# Patient Record
Sex: FEMALE | Race: WHITE | NOT HISPANIC OR LATINO | Employment: FULL TIME | URBAN - METROPOLITAN AREA
[De-identification: names, ages, dates, MRNs, and addresses within clinical notes are randomized per-mention and may not be internally consistent; named-entity substitution may affect disease eponyms.]

---

## 2017-03-18 ENCOUNTER — VIRTUAL VISIT (OUTPATIENT)
Dept: FAMILY MEDICINE | Facility: OTHER | Age: 27
End: 2017-03-18

## 2017-03-18 NOTE — PROGRESS NOTES
"Date:   Clinician: Teo Amaral  Clinician NPI: [id number]  Patient: Gwendolyn Yi  Patient : 1990  Patient Address: [facility] [address] [place] [address]  Patient Phone: [phone]  Visit Protocol: URI  Patient Summary:  Gwendolyn is a 27 year old ( : 1990 ) female who initiated a Zip for a presumed sinus infection. When asked the question \"Do you have a [place] primary care physician?\", Gwendolyn responded \"I don't know\".    Gwendolyn was evaluated 4+ weeks ago in a clinic and diagnosed with sinusitis. Gwendolyn was given Amoxicillin. She took the medication(s) as prescribed.   Her symptoms started suddenly 1 week ago and consist of malaise, fever, rhinitis, post-nasal drainage, nasal congestion, and hoarse voice.   She denies myalgias, ear pain, chest pain, loss of appetite, cough, itchy eyes, chills, nausea, vomiting, dyspnea, dysphagia, and sore throat. She denies a history of facial surgery.   Her profuse nasal secretions are clear, yellow, and green. Her mild facial pain or pressure feels worse when bending over or leaning forward and is located on both sides of her head. The facial pain or pressure started after the onset of other URI symptoms.  She has teeth pain and is confident the tooth pain is not from a cavity, recent dental work or other mouth problems. Gwendolyn has a mild headache. The headache did not start before her other symptoms and is located on both sides of her head.   In the past year Gwendolyn has been diagnosed with one (1) episodes of sinusitis. When asked to feel her neck she reported enlarged lymph nodes. Gwendolyn noted that the enlarged neck lymph nodes developed AFTER the onset of upper respiratory symptoms. She denies axillary lymphadenopathy.   Her highest temperature was 100.9 degrees Fahrenheit. Her current temperature is 99.1 degrees Fahrenheit. Gwendolyn has had a fever for 1 - 2 days and used the oral method for measuring her temperature.   She has passed urine in the past 12 hours. "   Gwendolyn denies having COPD or other chronic lung disease.   Pulse: self-reported pulse rate as: 11 beats in 10 seconds.   Current Temperature (F): 99.1     Weight (in lbs): 194   She states she is not pregnant and denies breastfeeding. She has menstruated in the past month.   Gwendolyn does not smoke or use smokeless tobacco.   MEDICATIONS:  No current medications , ALLERGIES:    Patient free text response:   gissel    Clinician Response:  Dear Gwendolyn,  Based on the information you have provided, you likely have  acute sinusitis, otherwise known as a sinus infection.   I am prescribing fluticasone propionate nasal (Flonase) 50 mcg/spray. Take one or two inhalations in each nostril one time a day. There are no refills with this prescription.   I am prescribing amoxicillin-Pot Clavulanate [Augmentin] 875-125mg. Take one tablet by mouth two times a day for 10 days. There are no refills with this prescription.   Unless your are allergic to the over-the-counter medication(s) below, I recommend using:   A sinus irrigation kit such as Sinus Rinse, Neti Pot, SinuCleanse (or store brand). Be sure to use sterile or previously boiled water to prevent unwanted infections.   Guaifenesin + dextromethorphan (Robitussin DM, Mucinex DM). This is an over-the-counter medication that does not require a prescription.   A decongestant such as pseudoephedrine (Sudafed or store brand) to help your symptoms. This is an over-the-counter medication that does not require a prescription.   Ibuprofen. Take 1-3 200mg tablets (200-600mg) every 8 hours to help with the discomfort. Make sure to take the ibuprofen with food. Do not exceed 2400mg in 24 hours. This is an over-the-counter medication that does not require a prescription.   Drink plenty of liquids, especially water and take time to rest your body. This may mean taking a nap or going to bed earlier. Your body is fighting an infection and liquids and rest will improve the pace of recovery.  Remember to regularly wash your hands and avoid close contact with others to prevent spreading your infection.   Some people develop allergies to antibiotics. If you notice a new rash, significant swelling or difficulty breathing, stop the medication immediately and go into a clinic for physical evaluation.   Some women may also develop a yeast infection as a side effect of taking antibiotics. If you notice symptoms of a yeast infection, please use Zipnosis to get treatment.   If you become pregnant during this course of treatment with medication, stop taking the medication and contact your primary care clinician.   Diagnosis: Acute Sinusitis  Diagnosis ICD: J01.90  Prescription: amoxicillin-Pot Clavulanate (Augmentin) 875-125 mg oral tablet 20 tablets, 10 days supply. Take one tablet by mouth two times a day for 10 days. Refills: 0, Refill as needed: no, Allow substitutions: yes  Prescription: fluticasone propionate (Flonase) 50mcg nasal spray 16 gm, 30 days supply. Take one or two inhalations in each nostril one time a day. Refills: 0, Refill as needed: no, Allow substitutions: yes  Pharmacy: [facility] - [phone] - [address] [place] [address]

## 2018-12-03 ENCOUNTER — HEALTH MAINTENANCE LETTER (OUTPATIENT)
Age: 28
End: 2018-12-03

## 2019-01-14 ENCOUNTER — VIRTUAL VISIT (OUTPATIENT)
Dept: FAMILY MEDICINE | Facility: OTHER | Age: 29
End: 2019-01-14

## 2019-01-14 NOTE — PROGRESS NOTES
"Date:   Clinician: Keyshawn Payne  Clinician NPI: [id number]  Patient: Gwendolyn Yi  Patient : 1990  Patient Address: [address] [place] [address]  Patient Phone: [phone]  Visit Protocol: Eye conditions  Patient Summary:  Gwendolyn is a 29 year old (: 1990 ) female who initiated a Visit for conjunctivitis.  When asked the question \"Please sign me up to receive news, health information and promotions. \", Gwendolyn responded \"No\".    Images of her eye condition were uploaded.   Her symptoms started today and affect the left eye. The symptoms consist of drainage coming from the eye(s), eye redness, and itchy eye(s).   Symptom details     Drainage: The color of the drainage coming out of her eye(s) is white. The drainage is thick and causes her eyelids to be stuck shut in the morning.    Itchiness: Gwendolyn does not have seasonal allergies or hay fever.     Denied symptoms include eyelid swelling, light sensitivity, eye pain, and bumps on the eyelid. Gwendolyn does not have subconjunctival hemorrhage and has not experienced a decrease in vision. She does not feel feverish.    Gwendolyn has not had a recent diagnosis of conjunctivitis. She also has not had a recent cold or ear infection, foreign body in the eye(s), eye injury, and eye surgery. It is not known if Gwendolyn has recently been exposed to someone with a red eye or an eye infection. She does not wear contact lenses. Gwendolyn has not ever been diagnosed with glaucoma.   Gwendolyn has been using Opcon-A ophthalmic solution to treat her current symptoms.   Medication efficacy as reported by the patient (free text): Its not   Gwendolyn does not require proof of evaluation of her eye condition before returning to school, work, or .   Gwendolyn does not smoke or use smokeless tobacco.   She denies pregnancy and denies breastfeeding. She has menstruated in the past month.   MEDICATIONS: Zoloft oral, ALLERGIES: Tylenol-Codeine #3  Clinician Response:  Dear Gwendolyn,  " Based on the information provided, you most likely have bacterial conjunctivitis, more commonly called pink eye.  I am prescribing:  Sulfacetamide sodium 10% ophthalmic (eye) drops. Apply 1-2 drops into the affected eye(s) every 3-4 hours for 7 days. There are no refills with this prescription.  The medication I prescribed is an antibiotic medication. Infections can be caused by either bacteria or a virus, and often have similar symptoms, so it is possible that this is a viral infection. Antibiotics are only effective against bacterial infections, so when it is caused by a virus, the medication will not help symptoms improve or make it less contagious.  To reduce the spread of the eye infection, you should not use eye makeup until the infection has fully resolved, and be sure to wash your hands at least once per hour and avoid touching the eyes as much as possible.  The following will reduce the risk for future eye infections:     Frequent handwashing    Replace towels and washcloths daily    Do not share towels and washcloths with others    Replace eye makeup used while eyes were infected    Do not use anyone else's eye makeup     Follow up with your provider if you are taking your medication as directed and do not see any improvements after 2 days. Be seen earlier if you develop any new or worsening symptoms.   Diagnosis: Bacterial conjunctivitis  Diagnosis ICD: H10.9  Prescription: sulfacetamide sodium 10 % ophthalmic (eye) drops 1 15 ml dropper bottle, 7 days supply. Apply 1-2 drops into the affected eye(s) every 3-4 hours for 7 days. Refills: 0, Refill as needed: no, Allow substitutions: yes  Pharmacy: [facility] - - [phone] - [id number] [place] [address]

## 2019-04-09 ENCOUNTER — HOSPITAL ENCOUNTER (EMERGENCY)
Facility: CLINIC | Age: 29
Discharge: HOME | End: 2019-04-09
Attending: FAMILY MEDICINE | Admitting: FAMILY MEDICINE
Payer: COMMERCIAL

## 2019-04-09 VITALS
RESPIRATION RATE: 20 BRPM | OXYGEN SATURATION: 97 % | TEMPERATURE: 98 F | SYSTOLIC BLOOD PRESSURE: 132 MMHG | BODY MASS INDEX: 38.97 KG/M2 | WEIGHT: 220 LBS | DIASTOLIC BLOOD PRESSURE: 96 MMHG

## 2019-04-09 DIAGNOSIS — J45.31 MILD PERSISTENT ASTHMA WITH EXACERBATION: ICD-10-CM

## 2019-04-09 DIAGNOSIS — K21.00 GASTROESOPHAGEAL REFLUX DISEASE WITH ESOPHAGITIS: ICD-10-CM

## 2019-04-09 DIAGNOSIS — J30.2 SEASONAL ALLERGIC RHINITIS, UNSPECIFIED TRIGGER: ICD-10-CM

## 2019-04-09 PROCEDURE — 25000125 ZZHC RX 250: Performed by: FAMILY MEDICINE

## 2019-04-09 PROCEDURE — 99283 EMERGENCY DEPT VISIT LOW MDM: CPT | Mod: 25 | Performed by: FAMILY MEDICINE

## 2019-04-09 PROCEDURE — 94640 AIRWAY INHALATION TREATMENT: CPT | Performed by: FAMILY MEDICINE

## 2019-04-09 PROCEDURE — 99284 EMERGENCY DEPT VISIT MOD MDM: CPT | Mod: Z6 | Performed by: FAMILY MEDICINE

## 2019-04-09 RX ORDER — IPRATROPIUM BROMIDE AND ALBUTEROL SULFATE 2.5; .5 MG/3ML; MG/3ML
3 SOLUTION RESPIRATORY (INHALATION) ONCE
Status: COMPLETED | OUTPATIENT
Start: 2019-04-09 | End: 2019-04-09

## 2019-04-09 RX ORDER — PREDNISONE 20 MG/1
TABLET ORAL
Qty: 12 TABLET | Refills: 0 | Status: SHIPPED | OUTPATIENT
Start: 2019-04-09 | End: 2019-04-19

## 2019-04-09 RX ADMIN — IPRATROPIUM BROMIDE AND ALBUTEROL SULFATE 3 ML: .5; 3 SOLUTION RESPIRATORY (INHALATION) at 07:52

## 2019-04-09 ASSESSMENT — ENCOUNTER SYMPTOMS
NAUSEA: 0
SORE THROAT: 0
VOMITING: 0
SHORTNESS OF BREATH: 0
DIARRHEA: 0
CONSTIPATION: 0
COUGH: 1
FEVER: 0
SINUS PRESSURE: 0
FREQUENCY: 0
WHEEZING: 1
HEADACHES: 0
PALPITATIONS: 0
ABDOMINAL PAIN: 0
DIAPHORESIS: 0
CHILLS: 0
DYSURIA: 0
BLOOD IN STOOL: 0

## 2019-04-09 NOTE — ED PROVIDER NOTES
"  History     Chief Complaint   Patient presents with     Shortness of Breath     increase SOA, inhaler not relieving sx. finished prednisone and z pack last week     HPI  Gwendolyn Yi is a 29 year old female who presents with onset of dyspnea since early uary - treated at [facility] twice for \"pneumonia\".   complicated steroid course on  - 5 days course and had her second z-pack course in the last 2 months.  only uses albuterol at home for rescue no controller meds.  life long asthma history with rare albuteroll rescue use until this yearl.  recent marked increased in use - multiple times daily without relief.     low grade fever, coughing, wheezing. shortness of breath on exertion. chest tightness midline.  No tobacco - quit 2 years ago  \"eats tums\" - frequent use of antacids for acid reflux.    sweats - while on prednisone  No nausea or vomiting      Denies recent prolonged travel (>3 hours) by car or plane, history or FHx of venous thromboembolism, recent surgery (last 4 weeks), active cancer history, hypercoagulable state, estrogen or other medications/conditions causing VTE or  new unilateral swelling or pain in the legs or calves.     Allergies:  Allergies   Allergen Reactions     Nuts Anaphylaxis     Codeine Camsylate        Problem List:    Patient Active Problem List    Diagnosis Date Noted     Cyst of left ovary 2015     Priority: Medium     DUB (dysfunctional uterine bleeding) 2015     Priority: Medium        Past Medical History:    Past Medical History:   Diagnosis Date     Asthma      Depression      DUB (dysfunctional uterine bleeding)        Past Surgical History:    Past Surgical History:   Procedure Laterality Date     SURGICAL HISTORY OF -       bone removed from right foot       Family History:    Family History   Problem Relation Age of Onset     Lupus Mother      Cancer Father 42        esophageal-        Social History:  Marital Status:   " [2]  Social History     Tobacco Use     Smoking status: Never Smoker   Substance Use Topics     Alcohol use: Yes     Comment: occas     Drug use: No        Medications:      No current outpatient medications on file.      Review of Systems   Constitutional: Negative for chills, diaphoresis and fever.   HENT: Negative for ear pain, sinus pressure and sore throat.    Eyes: Negative for visual disturbance.   Respiratory: Positive for cough and wheezing. Negative for shortness of breath.    Cardiovascular: Negative for chest pain and palpitations.   Gastrointestinal: Negative for abdominal pain, blood in stool, constipation, diarrhea, nausea and vomiting.   Genitourinary: Negative for dysuria, frequency and urgency.   Skin: Negative for rash.   Neurological: Negative for headaches.   All other systems reviewed and are negative.      Physical Exam   BP: (!) 132/96  Heart Rate: 80  Temp: 98  F (36.7  C)  Resp: 20  Weight: 99.8 kg (220 lb)  SpO2: 94 %      Physical Exam   Constitutional: She appears distressed.   HENT:   Mouth/Throat: Oropharynx is clear and moist.   Eyes: Conjunctivae are normal.   Neck: Neck supple.   Cardiovascular: Normal rate, regular rhythm and normal heart sounds. Exam reveals no friction rub.   No murmur heard.  Pulmonary/Chest: No stridor. She is in respiratory distress. She has wheezes.   Abdominal: Soft. Bowel sounds are normal. She exhibits no distension. There is no tenderness. There is no guarding.   Musculoskeletal: She exhibits no edema.   Neurological: She exhibits normal muscle tone.   Skin: No rash noted. She is not diaphoretic.       ED Course        Procedures               Critical Care time:  none               No results found for this or any previous visit (from the past 24 hour(s)).    Medications   ipratropium - albuterol 0.5 mg/2.5 mg/3 mL (DUONEB) neb solution 3 mL (3 mLs Nebulization Given 4/9/19 0824)       Assessments & Plan (with Medical Decision Making)     MDM: Gwendolyn  Kd is a 29 year old female who presents with 2 months of refractory progression of her mild intermittent asthma for which she has had for some time but has been refractory to her as needed albuterol and she has required several courses of Zithromax and prednisone during which she is improved but then her symptoms recur.  She was on corticosteroids orally until only a few days ago and likely triggered by upper respiratory infection.  However she also has other possible triggers including allergic rhinitis which she notes persistent allergy symptoms such as rhinorrhea sneezing and itchy eyes as well as acid reflux that is undertreated with only as needed medications such as Tums.   Differential diagnosis is considered of refractory dyspnea, but she has no risk factors for venous thromboembolism, no associated significant chest pain, hypoxia, tachycardia.  She has no lower extremity edema or orthopnea.  Her wheezing present on exam and improved with DuoNeb's appears to be consistent with asthma.  She has quit tobacco.  We discussed management with extending her prednisone which completed on Sunday while restarting a steroid inhaler.  I prescribed Qvar twice daily.  We also discussed concurrently managing allergies and gastroesophageal reflux as below.  I given a consult for asthma and allergy specialist and have also recommended that she follow-up with primary provider before prednisone is complete.  Additional management may require a more potent corticosteroid inhaler or a combination with long-acting bronchodilator such as Advair.  I have also given her precautions for return and have discussed that additional testing including chest x-ray and laboratory testing may be needed for persistent dyspnea if atypical features occur.  At this time no repeat imaging or testing was indicated    I have reviewed the nursing notes.    I have reviewed the findings, diagnosis, plan and need for follow up with the patient.           Medication List      There are no discharge medications for this visit.         Final diagnoses:   Mild persistent asthma with exacerbation - Take prednisone 40 mg for 3 days, 20 mg for 3 days, 10 mg for 3 days then stop.  manage triggers (allergic rhinitis and GERD).  follow-up clinic for recheck. start qvar inhaler (or similar depneding on insurance).,  treat GERD, allergic rhinitis .  allergy asthma specialist and consider PFTs when done   Seasonal allergic rhinitis, unspecified trigger - use flonase daily starting now at least through allergy season. may also use zyrtec 10 mg orally daily.  follow-up asthma allergy specialist   Gastroesophageal reflux disease with esophagitis - prilsoec 20 mg orally daily for at least the next 4 weeks. no food 2 hours before bed. limit caffeine and alcohol,       4/9/2019   [facility] EMERGENCY DEPARTMENT     Gustavo Ortiz MD  04/09/19 6436

## 2019-04-09 NOTE — ED AVS SNAPSHOT
[facility] Emergency Department  [address] [address]  Phone:  [phone]  Fax:  [phone]                                    Gwendolyn Yi   MRN: [id number]    Department:  [facility] Emergency Department   Date of Visit:  4/9/2019           After Visit Summary Signature Page    I have received my discharge instructions, and my questions have been answered. I have discussed any challenges I see with this plan with the nurse or doctor.    ..........................................................................................................................................  Patient/Patient Representative Signature      ..........................................................................................................................................  Patient Representative Print Name and Relationship to Patient    ..................................................               ................................................  Date                                   Time    ..........................................................................................................................................  Reviewed by Signature/Title    ...................................................              ..............................................  Date                                               Time          22EPIC Rev 08/18

## 2019-04-09 NOTE — DISCHARGE INSTRUCTIONS
ICD-10-CM    1. Mild persistent asthma with exacerbation J45.31     Take prednisone 40 mg for 3 days, 20 mg for 3 days, 10 mg for 3 days then stop.  manage triggers (allergic rhinitis and GERD).  follow-up clinic for recheck. start qvar inhaler (or similar depneding on insurance).,  treat GERD, allergic rhinitis .  allergy asthma specialist and consider PFTs when done   2. Seasonal allergic rhinitis, unspecified trigger J30.2     use flonase daily starting now at least through allergy season. may also use zyrtec 10 mg orally daily.  follow-up asthma allergy specialist   3. Gastroesophageal reflux disease with esophagitis K21.0     prilsoec 20 mg orally daily for at least the next 4 weeks. no food 2 hours before bed. limit caffeine and alcohol,

## 2020-01-04 ENCOUNTER — HOSPITAL ENCOUNTER (EMERGENCY)
Facility: CLINIC | Age: 30
Discharge: HOME | End: 2020-01-04
Attending: FAMILY MEDICINE | Admitting: FAMILY MEDICINE
Payer: COMMERCIAL

## 2020-01-04 VITALS
DIASTOLIC BLOOD PRESSURE: 87 MMHG | HEART RATE: 70 BPM | RESPIRATION RATE: 16 BRPM | TEMPERATURE: 97.7 F | HEIGHT: 63 IN | WEIGHT: 220 LBS | SYSTOLIC BLOOD PRESSURE: 128 MMHG | OXYGEN SATURATION: 100 % | BODY MASS INDEX: 38.98 KG/M2

## 2020-01-04 DIAGNOSIS — H10.32 ACUTE BACTERIAL CONJUNCTIVITIS OF LEFT EYE: ICD-10-CM

## 2020-01-04 PROCEDURE — 99283 EMERGENCY DEPT VISIT LOW MDM: CPT | Performed by: FAMILY MEDICINE

## 2020-01-04 PROCEDURE — 99284 EMERGENCY DEPT VISIT MOD MDM: CPT | Mod: Z6 | Performed by: FAMILY MEDICINE

## 2020-01-04 RX ORDER — TETRACAINE HYDROCHLORIDE 5 MG/ML
1-2 SOLUTION OPHTHALMIC ONCE
Status: DISCONTINUED | OUTPATIENT
Start: 2020-01-04 | End: 2020-01-05 | Stop reason: HOSPADM

## 2020-01-04 RX ORDER — GENTAMICIN SULFATE 3 MG/ML
1 SOLUTION/ DROPS OPHTHALMIC EVERY 4 HOURS
Qty: 3 ML | Refills: 0 | Status: SHIPPED | OUTPATIENT
Start: 2020-01-04 | End: 2020-01-11

## 2020-01-04 ASSESSMENT — MIFFLIN-ST. JEOR: SCORE: 1702.04

## 2020-01-04 NOTE — LETTER
January 4, 2020      To Whom It May Concern:      Gwendolyn Yi was seen in our Emergency Department today, 01/04/20.  I expect her condition to improve over the next 3-4 days.  She may return to work, but until eye is without drainage, would no immunocompromised patient contact.    Sincerely,        Gustavo Ortiz MD

## 2020-01-04 NOTE — LETTER
January 4, 2020      To Whom It May Concern:      Gwendolyn Yi was seen in our Emergency Department today, 01/04/20.  I expect her condition to improve over the next 1-2 days.  She may return to work when improved.    Sincerely,      Dr Gustavo Marroquin RN

## 2020-01-04 NOTE — ED AVS SNAPSHOT
[facility] Emergency Department  [address] [address]  Phone:  [phone]  Fax:  [phone]                                    Gwendolyn Yi   MRN: [id number]    Department:  [facility] Emergency Department   Date of Visit:  1/4/2020           After Visit Summary Signature Page    I have received my discharge instructions, and my questions have been answered. I have discussed any challenges I see with this plan with the nurse or doctor.    ..........................................................................................................................................  Patient/Patient Representative Signature      ..........................................................................................................................................  Patient Representative Print Name and Relationship to Patient    ..................................................               ................................................  Date                                   Time    ..........................................................................................................................................  Reviewed by Signature/Title    ...................................................              ..............................................  Date                                               Time          22EPIC Rev 08/18

## 2020-01-05 NOTE — ED PROVIDER NOTES
"  History     Chief Complaint   Patient presents with     Eye Problem     left eye redness present, \"itchy feeling\" , feels \"like theirs grit in it\" no injury to eye per pt. no URI sx. no known exposure to pink eye.      HPI  Gwendolyn Yi is a 29 year old female who presents with normal vision history  - no contacts or glasses, with onset of eye redness, irritation this morning, as the day wore on developed increased LT eye drainage - film over the top.  no known foreign body, no eye injury,   was at dentist yesterday for cleaning and the hydro- was being used and did spray into the eyes.  previously healthy.  no vision change. No eye pain -just irritation and no cold sores     no URi symptoms  Allergies:  Allergies   Allergen Reactions     Nuts Anaphylaxis     Codeine Camsylate        Problem List:    Patient Active Problem List    Diagnosis Date Noted     Cyst of left ovary 2015     Priority: Medium     DUB (dysfunctional uterine bleeding) 2015     Priority: Medium        Past Medical History:    Past Medical History:   Diagnosis Date     Asthma      Depression      DUB (dysfunctional uterine bleeding)        Past Surgical History:    Past Surgical History:   Procedure Laterality Date     SURGICAL HISTORY OF -       bone removed from right foot       Family History:    Family History   Problem Relation Age of Onset     Lupus Mother      Cancer Father 42        esophageal-        Social History:  Marital Status:   [2]  Social History     Tobacco Use     Smoking status: Never Smoker   Substance Use Topics     Alcohol use: Yes     Comment: occas     Drug use: No        Medications:    beclomethasone HFA (QVAR REDIHALER) 80 MCG/ACT inhaler          Review of Systems    ROS:  5 point ROS negative except as noted above in HPI, including Gen., Resp., CV, GI &  system review.      Physical Exam   BP: 128/87  Pulse: 70  Temp: 97.7  F (36.5  C)  Resp: 16  Height: 160 cm (5' 3\")  Weight: " 99.8 kg (220 lb)  SpO2: 100 %      Physical Exam     LT eye injection, drainage mild, no chemosis.  no light sensitivity. Normal globe. No fluorescein uptake.  No lesions.     pupils bilaterally dilated to 4 mm and reactive.  EOMI intact   slit lamp exam negative    ED Course        Procedures               Critical Care time:  none               No results found for this or any previous visit (from the past 24 hour(s)).    Medications - No data to display    Assessments & Plan (with Medical Decision Making)     MDM: Gwendolyn Yi is a 29 year old female who presents with conjunctival injection, discharge, eye pain, mattering without upper respiratory infection and possible exposure when she had her dental cleaning yesterday and noted splashing from the dental device into her eye.  No contact lens use.  Vision is normal here.  Recommended treating as possible bacterial conjunctivitis with recommendations as below and precautions for return.    I have reviewed the nursing notes.    I have reviewed the findings, diagnosis, plan and need for follow up with the patient.       New Prescriptions    No medications on file         ICD-10-CM    1. Acute bacterial conjunctivitis of left eye H10.32     use gent drops every 4 hours while awake for the next 5-7 days.  return immed for vision change.  note for work             1/4/2020   [facility] EMERGENCY DEPARTMENT     Gustavo Ortiz MD  01/05/20 0108

## 2020-01-05 NOTE — DISCHARGE INSTRUCTIONS
ICD-10-CM    1. Acute bacterial conjunctivitis of left eye H10.32     use gent drops every 4 hours while awake for the next 5-7 days.  return immed for vision change.  note for work

## 2020-01-05 NOTE — ED NOTES
Pt c/o left eye irritation, feels like it is pink eye. States has been using allergy drops all day with no relief. Eye blood shot.

## 2020-03-01 ENCOUNTER — HEALTH MAINTENANCE LETTER (OUTPATIENT)
Age: 30
End: 2020-03-01

## 2020-08-22 ENCOUNTER — OFFICE VISIT (OUTPATIENT)
Dept: URGENT CARE | Facility: URGENT CARE | Age: 30
End: 2020-08-22
Payer: COMMERCIAL

## 2020-08-22 VITALS
HEIGHT: 63 IN | TEMPERATURE: 97.8 F | RESPIRATION RATE: 16 BRPM | DIASTOLIC BLOOD PRESSURE: 78 MMHG | SYSTOLIC BLOOD PRESSURE: 104 MMHG | WEIGHT: 208.4 LBS | HEART RATE: 63 BPM | OXYGEN SATURATION: 100 % | BODY MASS INDEX: 36.93 KG/M2

## 2020-08-22 DIAGNOSIS — L08.9 LOCAL INFECTION OF SKIN AND SUBCUTANEOUS TISSUE: ICD-10-CM

## 2020-08-22 DIAGNOSIS — B35.3 ATHLETE'S FOOT, LEFT: Primary | ICD-10-CM

## 2020-08-22 PROCEDURE — 99203 OFFICE O/P NEW LOW 30 MIN: CPT | Performed by: NURSE PRACTITIONER

## 2020-08-22 RX ORDER — MONTELUKAST SODIUM 10 MG/1
10 TABLET ORAL
COMMUNITY
Start: 2020-02-12

## 2020-08-22 RX ORDER — FLUTICASONE PROPIONATE 50 MCG
1 SPRAY, SUSPENSION (ML) NASAL DAILY
COMMUNITY

## 2020-08-22 RX ORDER — CEPHALEXIN 500 MG/1
500 CAPSULE ORAL 3 TIMES DAILY
Qty: 21 CAPSULE | Refills: 0 | Status: SHIPPED | OUTPATIENT
Start: 2020-08-22 | End: 2020-08-29

## 2020-08-22 RX ORDER — THERMOMETER, ELECTRONIC,ORAL
EACH MISCELLANEOUS 2 TIMES DAILY
Qty: 113 G | Refills: 0 | Status: SHIPPED | OUTPATIENT
Start: 2020-08-22 | End: 2020-09-19

## 2020-08-22 ASSESSMENT — MIFFLIN-ST. JEOR: SCORE: 1644.43

## 2020-08-22 NOTE — PATIENT INSTRUCTIONS
Use cream and meds as directed.    Keep clean white socks on unless you are in sandals    Follow-up with your primary care provider next week and as needed.    Indications for emergent return to emergency department discussed with patient, who verbalized good understanding and agreement.  Patient understands the limitations of today's evaluation.             Patient Education     Athlete s Foot    Athlete s foot (tinea pedis) is caused by a fungal infection in the skin. It affects the skin between the toes, causing cracks in the skin called fissures. It can also affect the bottom of the foot where it causes dry white scales and peeling of the skin. This infection is more likely to occur when the foot is in hot, sweaty socks and shoes for long periods of time. You may feel itching and burning between your toes. This infection is treated with skin creams or medicine taken by mouth.  Home care  The following are general care guidelines:    It is important to keep the feet dry. Use absorbent cotton socks and change them if they become sweaty. Or wear an open-toe shoe or sandal. Wash the feet at least once a day with soap and water.    Apply the antifungal cream as prescribed. Some antifungal creams are available without a prescription.    It may take a week before the rash starts to improve. It can take about 3 to 4 weeks to completely clear. Continue the medicine until the rash is all gone.    Use over-the-counter antifungal powders or sprays on your feet after exposure to high-risk environments, such as public showers, gyms, and locker rooms. This can help prevent future infections. Wearing appropriate shoes in these situations can help.  Prevention  The following tips may help prevent athlete s foot:    Don't share shoes or socks with someone who has athlete's foot.    Don't walk barefoot in places where a fungal infection can spread quickly such as locker rooms, showers, and swimming pools.    Change socks  regularly.    Alternate shoes to assist in drying.  Follow-up care  Follow up with your healthcare provider as recommended if the rash does not improve after 10 days of treatment, or if the rash continues to spread.  When to seek medical care  Get medical attention right away if any of the following occur:    Fever of 100.4 F (38 C) or higher, or as directed    Increasing redness or swelling of the foot    Infection comes back soon after treatment    Pus draining from cracks in the skin  Date Last Reviewed: 8/1/2016 2000-2019 [employer]. [address] [place] [address], [place] [address]. All rights reserved. This information is not intended as a substitute for professional medical care. Always follow your healthcare professional's instructions.

## 2020-08-22 NOTE — PROGRESS NOTES
"Subjective     Gwendolyn Yi is a 30 year old female who presents to clinic today for the following health issues:    HPI   Chief Complaint   Patient presents with     Musculoskeletal Problem     left foot / toe pain. redness and swelling, itching, burning, hurts to walk on. and blisters inbetween toes x 1 1/2 weeks.  Has tried otc fugal cream               Review of Systems   Constitutional, HEENT, cardiovascular, pulmonary, GI, , musculoskeletal, neuro, skin, endocrine and psych systems are negative, except as otherwise noted.      Objective    /78   Pulse 63   Temp 97.8  F (36.6  C) (Tympanic)   Resp 16   Ht 1.6 m (5' 3\")   Wt 94.5 kg (208 lb 6.4 oz)   SpO2 100%   BMI 36.92 kg/m    Body mass index is 36.92 kg/m .  Physical Exam   GENERAL: healthy, alert and no distress, nontoxic in appearance  EYES: Eyes grossly normal to inspection, PERRL and conjunctivae and sclerae normal  HENT: normocephalic  NECK: supple with full ROM  ABDOMEN: soft, nontender  MS: no gross musculoskeletal defects noted, no edema  Left foot has mild athletes foot between 3rd and 4th toes with mild swelling and pinkness.     No results found for this or any previous visit (from the past 24 hour(s)).        Assessment & Plan  will treat athletes foot and secondary infection of left foot.  Problem List Items Addressed This Visit     None      Visit Diagnoses     Athlete's foot, left    -  Primary    Relevant Medications    fluticasone (FLONASE) 50 MCG/ACT nasal spray    montelukast (SINGULAIR) 10 MG tablet    cephALEXin (KEFLEX) 500 MG capsule    tolnaftate (TINACTIN) 1 % external cream    Local infection of skin and subcutaneous tissue        Relevant Medications    cephALEXin (KEFLEX) 500 MG capsule    tolnaftate (TINACTIN) 1 % external cream             BMI:   Estimated body mass index is 36.92 kg/m  as calculated from the following:    Height as of this encounter: 1.6 m (5' 3\").    Weight as of this encounter: 94.5 kg (208 lb " 6.4 oz).   Weight management plan: Patient was referred to their PCP to discuss a diet and exercise plan.          Patient Instructions   Use cream and meds as directed.    Keep clean white socks on unless you are in sandals    Follow-up with your primary care provider next week and as needed.    Indications for emergent return to emergency department discussed with patient, who verbalized good understanding and agreement.  Patient understands the limitations of today's evaluation.             Patient Education     Athlete s Foot    Athlete s foot (tinea pedis) is caused by a fungal infection in the skin. It affects the skin between the toes, causing cracks in the skin called fissures. It can also affect the bottom of the foot where it causes dry white scales and peeling of the skin. This infection is more likely to occur when the foot is in hot, sweaty socks and shoes for long periods of time. You may feel itching and burning between your toes. This infection is treated with skin creams or medicine taken by mouth.  Home care  The following are general care guidelines:    It is important to keep the feet dry. Use absorbent cotton socks and change them if they become sweaty. Or wear an open-toe shoe or sandal. Wash the feet at least once a day with soap and water.    Apply the antifungal cream as prescribed. Some antifungal creams are available without a prescription.    It may take a week before the rash starts to improve. It can take about 3 to 4 weeks to completely clear. Continue the medicine until the rash is all gone.    Use over-the-counter antifungal powders or sprays on your feet after exposure to high-risk environments, such as public showers, gyms, and locker rooms. This can help prevent future infections. Wearing appropriate shoes in these situations can help.  Prevention  The following tips may help prevent athlete s foot:    Don't share shoes or socks with someone who has athlete's foot.    Don't walk  barefoot in places where a fungal infection can spread quickly such as locker rooms, showers, and swimming pools.    Change socks regularly.    Alternate shoes to assist in drying.  Follow-up care  Follow up with your healthcare provider as recommended if the rash does not improve after 10 days of treatment, or if the rash continues to spread.  When to seek medical care  Get medical attention right away if any of the following occur:    Fever of 100.4 F (38 C) or higher, or as directed    Increasing redness or swelling of the foot    Infection comes back soon after treatment    Pus draining from cracks in the skin  Date Last Reviewed: 8/1/2016 2000-2019 [employer]. [address] [place] [id number]. All rights reserved. This information is not intended as a substitute for professional medical care. Always follow your healthcare professional's instructions.             Return in about 10 days (around 9/1/2020), or if symptoms worsen or fail to improve, for Follow up with your primary care provider.    KAYCEE Roman [facility]

## 2020-08-22 NOTE — LETTER
August 22, 2020      Gwendolyn Yi  [address]  [facility] [address]        To Whom It May Concern:    Gwendolyn Yi was seen in our clinic. Please release from work tomorrow.      Sincerely,        KAYCEE Roman CNP

## 2020-08-23 ASSESSMENT — ASTHMA QUESTIONNAIRES: ACT_TOTALSCORE: 25

## 2020-12-14 ENCOUNTER — HEALTH MAINTENANCE LETTER (OUTPATIENT)
Age: 30
End: 2020-12-14

## 2021-04-18 ENCOUNTER — HEALTH MAINTENANCE LETTER (OUTPATIENT)
Age: 31
End: 2021-04-18

## 2021-08-05 ENCOUNTER — HOSPITAL ENCOUNTER (EMERGENCY)
Facility: CLINIC | Age: 31
Discharge: HOME | End: 2021-08-05
Attending: NURSE PRACTITIONER | Admitting: NURSE PRACTITIONER
Payer: COMMERCIAL

## 2021-08-05 VITALS
HEIGHT: 63 IN | BODY MASS INDEX: 38.98 KG/M2 | HEART RATE: 89 BPM | WEIGHT: 220 LBS | DIASTOLIC BLOOD PRESSURE: 78 MMHG | SYSTOLIC BLOOD PRESSURE: 126 MMHG | OXYGEN SATURATION: 97 % | TEMPERATURE: 98.2 F

## 2021-08-05 DIAGNOSIS — J45.901 ASTHMA EXACERBATION: ICD-10-CM

## 2021-08-05 PROBLEM — F41.9 ANXIETY: Status: ACTIVE | Noted: 2017-09-19

## 2021-08-05 LAB
DEPRECATED S PYO AG THROAT QL EIA: NEGATIVE
GROUP A STREP BY PCR: NOT DETECTED
SARS-COV-2 RNA RESP QL NAA+PROBE: NEGATIVE

## 2021-08-05 PROCEDURE — G0463 HOSPITAL OUTPT CLINIC VISIT: HCPCS | Performed by: NURSE PRACTITIONER

## 2021-08-05 PROCEDURE — 87651 STREP A DNA AMP PROBE: CPT | Performed by: NURSE PRACTITIONER

## 2021-08-05 PROCEDURE — 87635 SARS-COV-2 COVID-19 AMP PRB: CPT | Performed by: NURSE PRACTITIONER

## 2021-08-05 PROCEDURE — 99213 OFFICE O/P EST LOW 20 MIN: CPT | Performed by: NURSE PRACTITIONER

## 2021-08-05 PROCEDURE — C9803 HOPD COVID-19 SPEC COLLECT: HCPCS | Performed by: NURSE PRACTITIONER

## 2021-08-05 RX ORDER — FLUTICASONE PROPIONATE 220 UG/1
2 AEROSOL, METERED RESPIRATORY (INHALATION) 2 TIMES DAILY
COMMUNITY
Start: 2020-10-15

## 2021-08-05 RX ORDER — PREDNISONE 20 MG/1
TABLET ORAL
Qty: 10 TABLET | Refills: 0 | Status: SHIPPED | OUTPATIENT
Start: 2021-08-05 | End: 2022-02-16

## 2021-08-05 ASSESSMENT — MIFFLIN-ST. JEOR: SCORE: 1692.04

## 2021-08-05 NOTE — ED PROVIDER NOTES
History     Chief Complaint   Patient presents with     Cough     HPI    SUBJECTIVE: Gwendolyn Yi with PMH of asthma, seasonal allergies presents to the urgent care with concerns of a Cough  The patient has had symptoms of cough, sore throat, nasal congestion/runny nose, headache, facial pressure, sneezing and little short of breath last night .   Onset of symptoms was 6 days ago. Course of illness is worsening.  Patient denies exposure to illness at home.  Pt reports family became ill after patient became ill   Patient denies fever, nausea, vomiting, diarrhea, chest congestion, wheezing, decreased appetite and decreased activity  Treatment measures tried include mucinex, albuterol inhaler, flonase, singulair, fluticasone inhaler for asthma daily.  Patient is not exposed to tobacco    Allergies:  Allergies   Allergen Reactions     Nuts Anaphylaxis     Codeine Camsylate      Pamprin Multi-Symptom      Pt reported       Problem List:    Patient Active Problem List    Diagnosis Date Noted     Patient is a currently breast-feeding mother 2021     Priority: Medium     Anxiety 2017     Priority: Medium     Cyst of left ovary 2015     Priority: Medium     DUB (dysfunctional uterine bleeding) 2015     Priority: Medium     Pes planus 2013     Priority: Medium     Allergy to other foods 2012     Priority: Medium     Premenstrual tension syndrome 2010     Priority: Medium     Allergic rhinitis 2008     Priority: Medium        Past Medical History:    Past Medical History:   Diagnosis Date     Asthma      Depression      DUB (dysfunctional uterine bleeding)        Past Surgical History:    Past Surgical History:   Procedure Laterality Date     SURGICAL HISTORY OF -       bone removed from right foot       Family History:    Family History   Problem Relation Age of Onset     Lupus Mother      Cancer Father 42        esophageal-        Social History:  Marital Status:   " [2]  Social History     Tobacco Use     Smoking status: Never Smoker     Smokeless tobacco: Never Used   Substance Use Topics     Alcohol use: Yes     Comment: occas     Drug use: No        Medications:    fluticasone (FLOVENT HFA) 220 MCG/ACT inhaler  predniSONE (DELTASONE) 20 MG tablet  fluticasone (FLONASE) 50 MCG/ACT nasal spray  montelukast (SINGULAIR) 10 MG tablet        Review of Systems  As mentioned above in the history present illness. All other systems were reviewed and are negative.    Physical Exam   BP: 126/78  Pulse: 89  Temp: 98.2  F (36.8  C)  Height: 160 cm (5' 3\")  Weight: 99.8 kg (220 lb)  SpO2: 97 %      Physical Exam  GENERAL: no apparent distress  EYES: Conjunctiva are not injected, no discharge.  EARS: Left TM -no erythema, no effusion,  not bulged.               Right TM -no erythema, no effusion,  not bulged.  NOSE: no discharge, no sinus tenderness  THROAT: no erythema, no exudate, no lesions  NECK: supple, no adenopathy.  CARDIAC: regular rate and rhythm, no murmur  RESP: clear, no wheezing, no rales, no rhonchi  ABD: soft, no distension, no tenderness  SKIN: No rashes      ED Course        Procedures    Results for orders placed or performed during the hospital encounter of 08/05/21 (from the past 24 hour(s))   Streptococcus A Rapid Screen w/Reflex to PCR    Specimen: Throat; Swab   Result Value Ref [facility]    Group A Strep antigen Negative Negative   Symptomatic COVID-19 Virus (Coronavirus) by PCR Nasopharyngeal    Specimen: Nasopharyngeal; Swab    Narrative    The following orders were created for panel order Symptomatic COVID-19 Virus (Coronavirus) by PCR Nasopharyngeal.  Procedure                               Abnormality         Status                     ---------                               -----------         ------                     SARS-COV2 (COVID-19) Vir...[[id number]]  Normal              Final result                 Please view results for these tests on the " individual orders.   SARS-COV2 (COVID-19) Virus RT-PCR    Specimen: Nasopharyngeal; Swab   Result Value Ref [facility]    SARS CoV2 PCR Negative Negative    Narrative    Testing was performed using the ty  SARS-CoV-2 & Influenza A/B Assay on the ty  [employer]  System.  This test should be ordered for the detection of SARS-COV-2 in individuals who meet SARS-CoV-2 clinical and/or epidemiological criteria. Test performance is unknown in asymptomatic patients.  This test is for in vitro diagnostic use under the FDA EUA for laboratories certified under CLIA to perform moderate and/or high complexity testing. This test has not been FDA cleared or approved.  A negative test does not rule out the presence of PCR inhibitors in the specimen or target RNA in concentration below the limit of detection for the assay. The possibility of a false negative should be considered if the patient's recent exposure or clinical presentation suggests COVID-19.  [facility] are certified under the Clinical Laboratory Improvement Amendments of 1988 (CLIA-88) as qualified to perform moderate and/or high complexity laboratory testing.       Medications - No data to display    Assessments & Plan (with Medical Decision Making)     I have reviewed the nursing notes.    I have reviewed the findings, diagnosis, plan and need for follow up with the patient.  Medical Decision Making:  CXR is not indicated.  Rapid Strep test is indicated and negative.  Covid is indicated and negative.  Differential asthma exacerbation secondary to air quality versus virus, seasonal allergies.    Assessment:  1) Asthma exacerbation.    PLAN:  I recommend increase Flovent inhaler 2 puffs twice daily as originally prescribed.  I also recommend 40 mg of prednisone daily for 5 days.  Patient verbalized understanding regarding the above.  Patient discharged in stable condition.    Discharge Medication List as of 8/5/2021  5:30 PM      START taking these  medications    Details   predniSONE (DELTASONE) 20 MG tablet Take two tablets (= 40mg) each day for 5 (five) days, Disp-10 tablet, R-0, E-Prescribe             Final diagnoses:   Asthma exacerbation       8/5/2021   [facility] EMERGENCY DEPT     Dafne Kraft, KAYCEE CNP  08/05/21 [id number]

## 2021-08-05 NOTE — DISCHARGE INSTRUCTIONS
Start prednisone and take 40 mg daily for 5 days.  Increase your inhaler to 2 puffs twice daily.  Follow-up with Dr. Daigle in 1 week if no improvement.

## 2021-10-02 ENCOUNTER — HEALTH MAINTENANCE LETTER (OUTPATIENT)
Age: 31
End: 2021-10-02

## 2021-11-24 ENCOUNTER — LAB (OUTPATIENT)
Dept: LAB | Facility: CLINIC | Age: 31
End: 2021-11-24
Payer: COMMERCIAL

## 2021-11-24 ENCOUNTER — E-VISIT (OUTPATIENT)
Dept: URGENT CARE | Facility: CLINIC | Age: 31
End: 2021-11-24
Payer: COMMERCIAL

## 2021-11-24 DIAGNOSIS — Z20.822 SUSPECTED COVID-19 VIRUS INFECTION: ICD-10-CM

## 2021-11-24 DIAGNOSIS — J02.9 SORE THROAT: ICD-10-CM

## 2021-11-24 PROCEDURE — 87651 STREP A DNA AMP PROBE: CPT

## 2021-11-24 PROCEDURE — U0003 INFECTIOUS AGENT DETECTION BY NUCLEIC ACID (DNA OR RNA); SEVERE ACUTE RESPIRATORY SYNDROME CORONAVIRUS 2 (SARS-COV-2) (CORONAVIRUS DISEASE [COVID-19]), AMPLIFIED PROBE TECHNIQUE, MAKING USE OF HIGH THROUGHPUT TECHNOLOGIES AS DESCRIBED BY CMS-2020-01-R: HCPCS

## 2021-11-24 PROCEDURE — 99421 OL DIG E/M SVC 5-10 MIN: CPT | Performed by: NURSE PRACTITIONER

## 2021-11-24 PROCEDURE — U0005 INFEC AGEN DETEC AMPLI PROBE: HCPCS

## 2021-11-24 NOTE — PATIENT INSTRUCTIONS
Dear Gwendolyn Yi,    Your symptoms show that you may have coronavirus (COVID-19). This illness can cause fever, cough and trouble breathing. Many people get a mild case and get better on their own. Some people can get very sick.    Because you also reported sore throat I would like to also test you for Strep Throat to determine if we need to treat you for that as well.    What should I do?  We would like to test you for Covid-19 virus and Strep Throat. I have placed orders for these tests.     For all employees or close contacts (except [place] and [employer] - see below), go to your [employer] home page and scroll down to the section that says  You have an appointment that needs to be scheduled  and click the large green button that says  Schedule Now  and follow the steps to find the next available opening.  It is important that when you are asked what the reason for your appointment is that you mention you need BOTH Covid and Strep tests.  tests.     If you are unable to complete these steps or if you cannot find any available times, please call [phone] to schedule employee testing.     [place] employees or close contacts, please call [phone].   [place] ([employer]) employees or close contacts call [phone].    Return to work/school/ guidance:  Please let your workplace manager and staffing office know when your quarantine ends     We can t give you an exact date as it depends on the above. You can calculate this on your own or work with your manager/staffing office to calculate this. (For example if you were exposed on 10/4, you would have to quarantine for 14 full days. That would be through 10/18. You could return on 10/19.)      If you receive a positive COVID-19 test result, follow the guidance of the those who are giving you the results. Usually the return to work is 10 (or in some cases 20 days from symptom onset.) If you work at [employer], you must also be cleared by  Employee Occupational Health and Safety to return to work.        If you receive a negative COVID-19 test result and did not have a high risk exposure to someone with a known positive COVID-19 test, you can return to work once you're free of fever for 24 hours without fever-reducing medication and your symptoms are improving or resolved.      If you receive a negative COVID-19 test and If you had a high risk exposure to someone who has tested positive for COVID-19 then you can return to work 14 days after your last contact with the positive individual    Note: If you have ongoing exposure to the covid positive person, this quarantine period may be more than 14 days. (For example, if you are continued to be exposed to your child who tested positive and cannot isolate from them, then the quarantine of 7-14 days can't start until your child is no longer contagious. This is typically 10 days from onset of the child's symptoms. So the total duration may be 17-24 days in this case.)    Sign up for [employer].   We know it's scary to hear that you might have COVID-19. We want to track your symptoms to make sure you're okay over the next 2 weeks. Please look for an email from [employer]--this is a free, online program that we'll use to keep in touch. To sign up, follow the link in the email you will receive. Learn more at [email or web address]    How can I take care of myself?    Get lots of rest. Drink extra fluids (unless a doctor has told you not to)    Take Tylenol (acetaminophen) or ibuprofen for fever or pain. If you have liver or kidney problems, ask your family doctor if it's okay to take Tylenol o ibuprofen    If you have other health problems (like cancer, heart failure, an organ transplant or severe kidney disease): Call your specialty clinic if you don't feel better in the next 2 days.    Know when to call 911. Emergency warning signs include:  o Trouble breathing or shortness of breath  o Pain  or pressure in the chest that doesn't go away  o Feeling confused like you haven't felt before, or not being able to wake up  o Bluish-colored lips or face    Where can I get more information?  [facility] - About COVID-19:   [email or web address]    CDC - What to Do If You're Sick:   www.cdc.gov/coronavirus/2019-ncov/about/steps-when-sick.html

## 2021-11-28 ENCOUNTER — LAB REQUISITION (OUTPATIENT)
Dept: LAB | Facility: CLINIC | Age: 31
End: 2021-11-28

## 2021-11-28 ENCOUNTER — APPOINTMENT (OUTPATIENT)
Dept: FAMILY MEDICINE | Facility: CLINIC | Age: 31
End: 2021-11-28
Payer: COMMERCIAL

## 2021-11-28 PROCEDURE — U0003 INFECTIOUS AGENT DETECTION BY NUCLEIC ACID (DNA OR RNA); SEVERE ACUTE RESPIRATORY SYNDROME CORONAVIRUS 2 (SARS-COV-2) (CORONAVIRUS DISEASE [COVID-19]), AMPLIFIED PROBE TECHNIQUE, MAKING USE OF HIGH THROUGHPUT TECHNOLOGIES AS DESCRIBED BY CMS-2020-01-R: HCPCS | Performed by: INTERNAL MEDICINE

## 2021-11-29 LAB — SARS-COV-2 RNA RESP QL NAA+PROBE: NEGATIVE

## 2022-02-16 ENCOUNTER — E-VISIT (OUTPATIENT)
Dept: URGENT CARE | Facility: CLINIC | Age: 32
End: 2022-02-16
Payer: COMMERCIAL

## 2022-02-16 DIAGNOSIS — H10.33 ACUTE CONJUNCTIVITIS OF BOTH EYES, UNSPECIFIED ACUTE CONJUNCTIVITIS TYPE: Primary | ICD-10-CM

## 2022-02-16 PROCEDURE — 99421 OL DIG E/M SVC 5-10 MIN: CPT | Performed by: NURSE PRACTITIONER

## 2022-02-16 RX ORDER — POLYMYXIN B SULFATE AND TRIMETHOPRIM 1; 10000 MG/ML; [USP'U]/ML
1-2 SOLUTION OPHTHALMIC EVERY 6 HOURS
Qty: 10 ML | Refills: 0 | Status: SHIPPED | OUTPATIENT
Start: 2022-02-16 | End: 2022-02-23

## 2022-02-17 NOTE — PATIENT INSTRUCTIONS
Thank you for choosing us for your care. I have placed an order for a prescription so that you can start treatment. View your full visit summary for details by clicking on the link below. Your pharmacist will able to address any questions you may have about the medication.     If you re not feeling better within 2-3 days, please schedule an appointment.  You can schedule an appointment right here in [facility], or call [phone]  If the visit is for the same symptoms as your eVisit, we ll refund the cost of your eVisit if seen within seven days.      Conjunctivitis, Nonspecific    The membrane that covers the white part of your eye (the conjunctiva) is inflamed. Inflammation happens when your body responds to an injury, allergic reaction, infection, or illness. Symptoms of inflammation in the eye may include redness, irritation, itching, swelling, or burning. These symptoms should go away within the next 24 hours. Conjunctivitis may be related to a particle that was in your eye. If so, it may wash out with your tears or irrigation treatment. Being exposed to liquid chemicals or fumes may also cause this reaction.    Home care    Put a cold pack on the eye for 20 minutes at a time. This will reduce pain. To make a cold pack, put ice cubes in a plastic bag that seals at the top. Wrap the bag in a clean, thin towel or cloth.    Artificial tears may be prescribed to reduce irritation or redness. These should be used 3 to 4 times a day.    You may use acetaminophen or ibuprofen to control pain, unless another medicine was prescribed. If you have chronic liver or kidney disease, talk with your healthcare provider before using these medicines. Also talk with your provider if you have ever had a stomach ulcer or gastrointestinal bleeding.    Follow-up care  Follow up with your healthcare provider, or as advised.  When to seek medical advice  Call your healthcare provider right away if any of these occur:    Eyelid swells  more    Eye pain gets worse    Redness or drainage from the eye gets worse    Blurry vision gets worse or you have increased sensitivity to light    Normal vision does not return within 24 to 48 hours  [employer] last reviewed this educational content on 2/1/2020 2000-2021 [employer]. All rights reserved. This information is not intended as a substitute for professional medical care. Always follow your healthcare professional's instructions.         penile discharge

## 2022-02-18 ENCOUNTER — E-VISIT (OUTPATIENT)
Dept: URGENT CARE | Facility: CLINIC | Age: 32
End: 2022-02-18
Payer: COMMERCIAL

## 2022-02-18 DIAGNOSIS — H10.33 ACUTE BACTERIAL CONJUNCTIVITIS OF BOTH EYES: Primary | ICD-10-CM

## 2022-02-18 PROCEDURE — 99421 OL DIG E/M SVC 5-10 MIN: CPT | Performed by: NURSE PRACTITIONER

## 2022-02-18 RX ORDER — OFLOXACIN 3 MG/ML
1-2 SOLUTION/ DROPS OPHTHALMIC EVERY 4 HOURS
Qty: 10 ML | Refills: 0 | Status: SHIPPED | OUTPATIENT
Start: 2022-02-18 | End: 2022-02-25

## 2022-02-18 NOTE — PATIENT INSTRUCTIONS
Thank you for choosing us for your care. I have placed an order for a prescription so that you can start treatment. View your full visit summary for details by clicking on the link below. Your pharmacist will able to address any questions you may have about the medication.     If you re not feeling better within 2-3 days, please schedule an appointment.  You can schedule an appointment right here in [facility], or call [phone]  If the visit is for the same symptoms as your eVisit, we ll refund the cost of your eVisit if seen within seven days.      Bacterial Conjunctivitis    You have an infection in the membranes covering the white part of the eye. This part of the eye is called the conjunctiva. The infection is called conjunctivitis. The most common symptoms of conjunctivitis include a thick, pus-like discharge from the eye, swollen eyelids, redness, eyelids sticking together upon awakening, and a gritty or scratchy feeling in the eye. Your infection was caused by bacteria. It may be treated with medicine. With treatment, the infection takes about 7 to 10 days to resolve.   Home care    Use prescribed antibiotic eye drops or ointment as directed to treat the infection.    Apply a warm compress (towel soaked in warm water) to the affected eye 3 to 4 times a day. Do this just before applying medicine to the eye.    Use a warm, wet cloth to wipe away crusting of the eyelids in the morning. This is caused by mucus drainage during the night. You may also use saline irrigating solution or artificial tears to rinse away mucus in the eye. Do not put a patch over the eye.    Wash your hands before and after touching the infected eye. This is to prevent spreading the infection to the other eye, and to other people. Don't share your towels or washcloths with others.    You may use acetaminophen or ibuprofen to control pain, unless another medicine was prescribed. Talk with your healthcare provider before using these  medicines if you have chronic liver or kidney disease. Also talk with your provider if you have ever had a stomach ulcer or digestive bleeding.    Don't wear contact lenses until your eyes have healed and all symptoms are gone.    Follow-up care  Follow up with your healthcare provider, or as advised.  When to seek medical advice  Call your healthcare provider right away if any of these occur:    Worsening vision    Increasing pain in the eye    Increasing swelling or redness of the eyelid    Redness spreading around the eye  [employer] last reviewed this educational content on 4/1/2020 2000-2021 [employer]. All rights reserved. This information is not intended as a substitute for professional medical care. Always follow your healthcare professional's instructions.

## 2022-05-14 ENCOUNTER — HEALTH MAINTENANCE LETTER (OUTPATIENT)
Age: 32
End: 2022-05-14

## 2022-09-03 ENCOUNTER — HEALTH MAINTENANCE LETTER (OUTPATIENT)
Age: 32
End: 2022-09-03

## 2023-06-03 ENCOUNTER — HEALTH MAINTENANCE LETTER (OUTPATIENT)
Age: 33
End: 2023-06-03

## 2023-12-08 ENCOUNTER — LAB REQUISITION (OUTPATIENT)
Dept: LAB | Facility: CLINIC | Age: 33
End: 2023-12-08
Payer: COMMERCIAL

## 2023-12-08 DIAGNOSIS — N97.9 FEMALE INFERTILITY, UNSPECIFIED: ICD-10-CM

## 2023-12-08 LAB
ESTRADIOL SERPL-MCNC: 22 PG/ML
FSH SERPL IRP2-ACNC: 10 MIU/ML
HOLD SPECIMEN: NORMAL
LH SERPL-ACNC: 6.3 MIU/ML
MIS SERPL-MCNC: 0.67 NG/ML (ref 0.58–8.1)
PROLACTIN SERPL 3RD IS-MCNC: 18 NG/ML (ref 5–23)

## 2023-12-08 PROCEDURE — 83001 ASSAY OF GONADOTROPIN (FSH): CPT | Mod: ORL | Performed by: OBSTETRICS & GYNECOLOGY

## 2023-12-08 PROCEDURE — 82670 ASSAY OF TOTAL ESTRADIOL: CPT | Mod: ORL | Performed by: OBSTETRICS & GYNECOLOGY

## 2023-12-08 PROCEDURE — 83520 IMMUNOASSAY QUANT NOS NONAB: CPT | Mod: ORL | Performed by: OBSTETRICS & GYNECOLOGY

## 2023-12-08 PROCEDURE — 84146 ASSAY OF PROLACTIN: CPT | Mod: ORL | Performed by: OBSTETRICS & GYNECOLOGY

## 2023-12-08 PROCEDURE — 83002 ASSAY OF GONADOTROPIN (LH): CPT | Mod: ORL | Performed by: OBSTETRICS & GYNECOLOGY

## 2023-12-24 ENCOUNTER — NURSE TRIAGE (OUTPATIENT)
Dept: NURSING | Facility: CLINIC | Age: 33
End: 2023-12-24
Payer: COMMERCIAL

## 2023-12-25 NOTE — TELEPHONE ENCOUNTER
"Gwendolyn has been having Rt Ear pain for the past few hours after doing a Nasal Saline irrigation    - Rated 7/10 - has not taken any pain medication  - Pain radiates a couple inches into cheek and down into jaw and neck  - She is able to hear from the ear but it is muffled  - Denies fever, bleeding, drainage    Advised to see PCP within 24 hours  Care Advice reviewed    She is worried & considering being seen in the ER  She will try taking some Ibuprofen to see if pain decreases    Jolly Coronado RN  [facility]      Reason for Disposition   Earache  (Exceptions: brief ear pain of < 60 minutes duration, earache occurring during air travel   Ear congestion    Additional Information   Negative: [1] Stiff neck (can't touch chin to chest) AND [2] fever   Negative: [1] Bony area of skull behind the ear is pink or swollen AND [2] fever   Negative: Fever > 104 F (40 C)   Negative: Patient sounds very sick or weak to the triager   Negative: [1] SEVERE pain AND [2] not improved 2 hours after taking analgesic medication (e.g., ibuprofen or acetaminophen)   Negative: Walking is very unsteady or feels very dizzy   Negative: Sudden onset of ear pain after long - thin object was inserted into the ear canal (e.g., pencil, Q-tip)   Negative: Diabetes mellitus or weak immune system (e.g., HIV positive, cancer chemo, splenectomy, organ transplant, chronic steroids)   Negative: New blurred vision or vision changes   Negative: White, yellow, or green discharge   Negative: Bloody discharge or unexplained bleeding from ear canal   Negative: Shock suspected (e.g., cold/pale/clammy skin, too weak to stand, low BP, rapid pulse)   Negative: [1] Similar pain previously AND [2] it was from \"heart attack\"   Negative: [1] Similar pain previously AND [2] it was from \"angina\" AND [3] not relieved by nitroglycerin   Negative: Sounds like a life-threatening emergency to the triager   Negative: Difficulty breathing or unusual " "sweating (e.g., sweating without exertion)   Negative: Can't close the mouth fully (i.e., \"mouth is locked open\", patient will have difficulty talking)   Negative: [1] Fever AND [2] localized red rash   Negative: [1] Fever AND [2] area of face is swollen   Negative: [1] New-onset jaw pain AND [2] unknown cause AND [3] at least one cardiac risk factor (e.g., 45 years or older, diabetes, high cholesterol, hypertension, obesity, smoker or strong family history of heart disease)   Negative: Patient sounds very sick or weak to the triager   Negative: [1] SEVERE pain (e.g., excruciating) AND [2] not improved after 2 hours of pain medicine   Negative: [1] Localized red rash AND [2] painful to the touch   Negative: [1] Painful rash AND [2] multiple small blisters grouped together (i.e., dermatomal distribution or \"band\" or \"stripe\" on one side of face)   Negative: [1] Swollen area of face AND [2] toothache   Negative: [1] Swollen area of face AND [2] is painful to touch   Negative: Swelling around the eye    Protocols used: Earache-A-AH, Ear - Congestion-A-AH, Face Pain-A-AH    "

## 2024-07-06 ENCOUNTER — HEALTH MAINTENANCE LETTER (OUTPATIENT)
Age: 34
End: 2024-07-06